# Patient Record
(demographics unavailable — no encounter records)

---

## 2024-10-29 NOTE — PHYSICAL EXAM
[Inflamed Nasal Mucosa] : inflamed nasal mucosa [NL] : regular rate and rhythm, normal S1, S2 audible, no murmurs [de-identified] : postnasal drip

## 2024-10-29 NOTE — REVIEW OF SYSTEMS
[Fever] : no fever [Headache] : no headache [Nasal Discharge] : no nasal discharge [Nasal Congestion] : nasal congestion [Sore Throat] : sore throat [Negative] : Genitourinary

## 2024-10-29 NOTE — DISCUSSION/SUMMARY
[FreeTextEntry1] : 13 year female with viral pharyngitis/postnasal drip. Rapid strep negative. Recommend tylenol/motrin for pain or fever, gargle with salt water, and throat lozenges as needed. Encourage fluids and rest. Return to office if symptoms worsen or persist.   Trial sudafed and flonase to help prevent it from turning into a sinus infection as well.

## 2024-10-29 NOTE — HISTORY OF PRESENT ILLNESS
[FreeTextEntry6] : 12 y/o patient presents today for sore throat and cough, x2-3 days. Stuffy nose/congestion as well. No nausea/vomiting/diarrhea. Sinus infections currently in family. No fevers. Afebrile in office. She is not having much nasal discharge. Her sore throat improved throughout the day today. She is prone to sinus infections. She took motrin today.

## 2024-11-05 NOTE — DISCUSSION/SUMMARY
[FreeTextEntry1] : 13 year female with acute sinusitis. Recommend antibiotics as prescribed, nasal saline rinses as tolerated. Return if symptoms worsen or persist. May trial probiotic while on antibiotics.

## 2024-11-05 NOTE — HISTORY OF PRESENT ILLNESS
[FreeTextEntry6] : 12 y/o presents with stuffy nose and coughing at times for about 3 weeks. She was in the office initially and it was felt to be a URI at that point. She has not gotten better and is prone to sinusitis.

## 2024-11-05 NOTE — PHYSICAL EXAM
[Inflamed Nasal Mucosa] : inflamed nasal mucosa [Hypertrophied Nasal Mucosa] : hypertrophied nasal mucosa [NL] : regular rate and rhythm, normal S1, S2 audible, no murmurs

## 2025-03-21 NOTE — DISCUSSION/SUMMARY
[FreeTextEntry1] : Pityriasis rosea-- agree with diagnosis from urgent care. Advised can take several weeks to resolve. Not worsening. It seems worse at times and lighter at times. Can trial hydrocortisone cream if itchy or oral zyrtec. Rapid strep and flu negative. Right shoulder strain-- warm compresses, motrin, gentle stretching. Refer to orthopedics.  Total time dedicated to this patient's visit includes preparing to see patient (reviewing chart, any pertinent labs/consults, etc.), obtaining and/or reviewing separately obtained history from patient and parent, performing medical examination, evaluation, counseling and educating patient/parent, ordering any needed medications and/or labs, documenting clinical information in the electronic record, reviewing any results subsequent to the orders placed during visit and discussing with patient/parent. Total time spent: 30 minutes.

## 2025-03-21 NOTE — PHYSICAL EXAM
[Clear Rhinorrhea] : clear rhinorrhea [NL] : warm, clear [de-identified] : right posterior shoulder pain, tenderness with muscle palpation between neck and right shoulder, full ROM and strength [de-identified] : erythematous patchy rash to abdomen, between breasts, and back

## 2025-03-21 NOTE — REVIEW OF SYSTEMS
[Fever] : no fever [Headache] : headache [Nasal Congestion] : nasal congestion [Rash] : rash [Itching] : itching [Negative] : Genitourinary [FreeTextEntry1] : shoulder pain

## 2025-03-21 NOTE — HISTORY OF PRESENT ILLNESS
[FreeTextEntry6] : 14 y/o patient presents today for rash on the stomach/back x 1-2 weeks, sore right shoulder x 2 weeks and head aches with nasal congestion x2 days. No fevers, Afebrile in office. The rash is itchy. They went to a walk in urgent care last weekend and was diagnosed with "Delmar tree rash". Rash is not improving so dad wanted second opinion. Shoulder has been hurting, slightly improving but not significantly. She plays soccer but has not played the last 2 weeks or so since her shoulder started to hurt. Dad not sure if the shoulder pain is related to the rash or an injury.

## 2025-03-24 NOTE — REASON FOR VISIT
[Initial Evaluation] : an initial evaluation [FreeTextEntry1] : right knee injury [Patient] : patient [Mother] : mother

## 2025-03-24 NOTE — DATA REVIEWED
[de-identified] : 3 views of the right knee 3/23/25 in the system of the right knee are negative for fracture or dislocation. Good overall alignment.

## 2025-03-24 NOTE — BIRTH HISTORY
[Duration: ___ wks] : duration: [unfilled] weeks [] :  [Normal?] : delivery not normal [___ lbs.] : [unfilled] lbs [Was child in NICU?] : Child was not in NICU [FreeTextEntry6] : twin

## 2025-03-24 NOTE — REVIEW OF SYSTEMS
[Change in Activity] : change in activity [Eczema] : eczema [Heart Problems] : no heart problems [Congestion] : no congestion [Feeding Problem] : no feeding problem [Menarche] : ~T menarche [Limping] : limping [Joint Pains] : arthralgias [Joint Swelling] : no joint swelling [Appropriate Age Development] : development appropriate for age [Sleep Disturbances] : ~T no sleep disturbances

## 2025-03-24 NOTE — ASSESSMENT
[FreeTextEntry1] : right knee effusion right knee injury  limited ROM of the right knee  The history for today's visit was obtained from the child, as well as the parent. The child's history was unreliable alone due to age and therefore, the parent was used today as an independent historian. 3 views of the right knee 3/23/25 in the system of the right knee are negative for fracture or dislocation. Good overall alignment.  She is guarding for the exam and cannot fully extend her knee. MRI of the right knee is needed to r/o meniscal pathology and evaluate the ACL to ensure no ACL tear.  Our office will obtain authorization and contact parent once obtained.  She will continue the ace and the crutches WBAT.  No gym or sports.  She will f/u after the MRI to discuss the results and plan of action. If meniscal tear present, surgery may be indicated  All questions answered. Parent in agreement with the plan. Shawna STONER, LOU, PAC, have acted as a scribe and documented the above for Dr. Ortega

## 2025-03-24 NOTE — HISTORY OF PRESENT ILLNESS
[FreeTextEntry1] : 12 yo female presents with mother for evaluation of right knee injury. She states she was playing soccer yesterday and she injured the knee. At the time, she heard a ":pop" and developed pain. She was unable to weight bear and she is unable to fully straighten the knee. She was seen at [facility] yesterday and xrays taken at that time were negative for fracture or dislocation. She has been icing and taking NSAIDS but pain still present. No prior knee injury reported. She also has difficulty bending the knee. Pain localized to the sides of the knee and back of the knee.  [Stable] : stable

## 2025-03-24 NOTE — PHYSICAL EXAM
[FreeTextEntry1] : GAIT: ambulates with crutches, NWB on affected extremity with good coordination and balance. Shows competency with crutching. GENERAL: alert, cooperative pleasant young 14 yo female in NAD SKIN: The skin is intact, warm, pink and dry over the area examined. EYES: Normal conjunctiva, normal eyelids and pupils were equal and round. ENT: normal ears, normal nose and normal lips. CARDIOVASCULAR: brisk capillary refill, but no peripheral edema. RESPIRATORY: The patient is in no apparent respiratory distress. They're taking full deep breaths without use of accessory muscles or evidence of audible wheezes or stridor without the use of a stethoscope. Normal respiratory effort. ABDOMEN: not examined   Hips: full symmetrical ROM without tenderness elicited.  right Knee: + effusion noted. No STS, erythema or warmth noted. unable to fully extend the knee, lacking approx 10 degrees, flexion to approx 60 degrees.  tender over the medial and lateral joint line as well as the posterior aspect lateral joint line.  No instability to varus/valgus stress.  Guarding for exam but Lachman and Anterior/posterior drawer appear to have an endpoint. + joint line tenderness. Negative patella apprehension. Negative patella grind test. Negative patella J-sign. No calf tenderness ankle: full ROM without instability to stress. No tenderness or STS.  Distal motor 5/5 sensation grossly intact brisk cap refill

## 2025-04-08 NOTE — DATA REVIEWED
[de-identified] : Right knee MRI was obtained at [facility] on 3/27/2025 and independently reviewed today: There is evidence for recent anterior cruciate ligament rupture from its proximal femoral attachment. There are associated contusions including a mildly impacted contusion of the lateral femoral central sulcus as well as the posterior aspects of the medial and lateral tibial plateaus.  Prior imaging was once again reviewed and is as noted below: 3 views of the right knee 3/23/25 in the system of the right knee are negative for fracture or dislocation. Good overall alignment.

## 2025-04-08 NOTE — END OF VISIT
[FreeTextEntry3] : IFrantz MD, personally saw and evaluated the patient and developed the plan as documented above. I concur or have edited the note as appropriate. [Time Spent: ___ minutes] : I have spent [unfilled] minutes of time on the encounter which excludes teaching and separately reported services.

## 2025-04-08 NOTE — ASSESSMENT
[FreeTextEntry1] : 13-year-old female with a right knee injury sustained on 3/23/2025, 1.5 weeks ago, when she was in soccer and felt a pop.  MRI showing an ACL rupture.  -We discussed the interval progress, physical exam, and all available images at length during today's visit with patient and her parent/guardian who served as an independent historian due to child's age and unreliable nature of history. -Right knee MRI was obtained at [facility] on 3/27/2025 and independently reviewed today: There is evidence for recent anterior cruciate ligament rupture from its proximal femoral attachment. There are associated contusions including a mildly impacted contusion of the lateral femoral central sulcus as well as the posterior aspects of the medial and lateral tibial plateaus. -The etiology, pathoanatomy, treatment modalities, and expected natural history of the injury were discussed at length today. -Clinically, she has expected limited range of motion of the knee with a large knee joint effusion. -Significance of ACL ruptures in adolescents/young adults was discussed today.  Treatment options consisting of operative versus nonoperative approaches were addressed.  Given significantly increased risk of chronic instability, early arthritis, and subsequent meniscal tears in ACL deficient knees, we recommended operative intervention to include ACL reconstruction. -The patient and family understood the implications and recommendations and elected to further pursue surgical intervention -Therefore, we have referred the patient to my partner, Dr. Augustine for surgical evaluation.  Contact information was provided.  Additionally, our office will contact the family to arrange an appointment. -In the interim, she may continue to bear weight as tolerated on the right lower extremity.  We recommended daily knee range of motion exercises to improve her current arc of motion.  Sample exercises were demonstrated during today's visit. -She will remain out of all gym, sports, running, jumping, rough play.  School note was provided today. -OTC NSAIDs as needed -She may return to our clinic at anytime with any additional questions or concerns   All questions and concerns were addressed today. Parent and patient verbalize understanding and agree with plan of care.   I, Zandra Ibarra, have acted as a scribe and documented the above information for Dr. Ortega.   This note was created using Dragon Voice Recognition Software and may have been partially created using H[employer] software which was then reviewed and edited to the best of my ability. Sporadic inaccurate translation may have occurred. If there are any questions about content of the note, please contact the office for clarification.

## 2025-04-08 NOTE — HISTORY OF PRESENT ILLNESS
[FreeTextEntry1] : Rosa is a 13-year-old female with a right knee injury sustained on 3/23/2025.  Per reports she was playing soccer when she felt a pop in her knee.  She was unable to bear weight or straighten the knee.  She presented to [facility] where radiographs were obtained and were negative for fracture or dislocation.  It was recommended she follow-up with pediatric orthopedics.  On initial evaluation due to clinical examination and MRI of the knee was ordered. Please see prior clinic notes for additional information.   Today, she continues to report discomfort with knee range of motion.  She has been using her Ace wrap and crutches as directed.  She denies any numbness or tingling in the legs.  She has been icing.  She presents today for continued management of the above.

## 2025-04-08 NOTE — DATA REVIEWED
[de-identified] : Right knee MRI was obtained at [facility] on 3/27/2025 and independently reviewed today: There is evidence for recent anterior cruciate ligament rupture from its proximal femoral attachment. There are associated contusions including a mildly impacted contusion of the lateral femoral central sulcus as well as the posterior aspects of the medial and lateral tibial plateaus.  Prior imaging was once again reviewed and is as noted below: 3 views of the right knee 3/23/25 in the system of the right knee are negative for fracture or dislocation. Good overall alignment.

## 2025-04-08 NOTE — PHYSICAL EXAM
[FreeTextEntry1] : GAIT: ambulates with crutches, NWB on affected extremity with good coordination and balance. Shows competency with crutching.  GENERAL: alert, cooperative pleasant young 14 yo female in NAD SKIN: The skin is intact, warm, pink and dry over the area examined. EYES: Normal conjunctiva, normal eyelids and pupils were equal and round. ENT: normal ears, normal nose and normal lips. CARDIOVASCULAR: brisk capillary refill, but no peripheral edema. RESPIRATORY: The patient is in no apparent respiratory distress. They're taking full deep breaths without use of accessory muscles or evidence of audible wheezes or stridor without the use of a stethoscope. Normal respiratory effort. ABDOMEN: not examined    *unchanged** Hips: full symmetrical ROM without tenderness elicited.  right Knee: + effusion noted. No STS, erythema or warmth noted. unable to fully extend the knee, lacking approx 10 degrees, flexion to approx 60 degrees.  tender over the medial and lateral joint line as well as the posterior aspect lateral joint line.  No instability to varus/valgus stress.  Guarding for exam but Lachman and Anterior/posterior drawer appear to have an endpoint. + joint line tenderness. Negative patella apprehension. Negative patella grind test. Negative patella J-sign. No calf tenderness ankle: full ROM without instability to stress. No tenderness or STS.  Distal motor 5/5 sensation grossly intact brisk cap refill

## 2025-04-08 NOTE — ASSESSMENT
[FreeTextEntry1] : 13-year-old female with a right knee injury sustained on 3/23/2025, 1.5 weeks ago, when she was in soccer and felt a pop.  MRI showing an ACL rupture.  -We discussed the interval progress, physical exam, and all available images at length during today's visit with patient and her parent/guardian who served as an independent historian due to child's age and unreliable nature of history. -Right knee MRI was obtained at [facility] on 3/27/2025 and independently reviewed today: There is evidence for recent anterior cruciate ligament rupture from its proximal femoral attachment. There are associated contusions including a mildly impacted contusion of the lateral femoral central sulcus as well as the posterior aspects of the medial and lateral tibial plateaus. -The etiology, pathoanatomy, treatment modalities, and expected natural history of the injury were discussed at length today. -Clinically, she has expected limited range of motion of the knee with a large knee joint effusion. -Significance of ACL ruptures in adolescents/young adults was discussed today.  Treatment options consisting of operative versus nonoperative approaches were addressed.  Given significantly increased risk of chronic instability, early arthritis, and subsequent meniscal tears in ACL deficient knees, we recommended operative intervention to include ACL reconstruction. -The patient and family understood the implications and recommendations and elected to further pursue surgical intervention -Therefore, we have referred the patient to my partner, Dr. Augustine for surgical evaluation.  Contact information was provided.  Additionally, our office will contact the family to arrange an appointment. -In the interim, she may continue to bear weight as tolerated on the right lower extremity.  We recommended daily knee range of motion exercises to improve her current arc of motion.  Sample exercises were demonstrated during today's visit. -She will remain out of all gym, sports, running, jumping, rough play.  School note was provided today. -OTC NSAIDs as needed -She may return to our clinic at anytime with any additional questions or concerns   All questions and concerns were addressed today. Parent and patient verbalize understanding and agree with plan of care.   I, Zandra Ibarra, have acted as a scribe and documented the above information for Dr. Ortega.   This note was created using Dragon Voice Recognition Software and may have been partially created using Job[employer] software which was then reviewed and edited to the best of my ability. Sporadic inaccurate translation may have occurred. If there are any questions about content of the note, please contact the office for clarification.

## 2025-04-08 NOTE — REVIEW OF SYSTEMS
[Change in Activity] : change in activity [Eczema] : eczema [Menarche] : ~T menarche [Limping] : limping [Joint Pains] : arthralgias [Appropriate Age Development] : development appropriate for age [Fever Above 102] : no fever [Malaise] : no malaise [Rash] : no rash [Eye Pain] : no eye pain [Heart Problems] : no heart problems [Congestion] : no congestion [Feeding Problem] : no feeding problem [Joint Swelling] : no joint swelling [Sleep Disturbances] : ~T no sleep disturbances

## 2025-04-08 NOTE — BIRTH HISTORY
[Duration: ___ wks] : duration: [unfilled] weeks [] :  [___ lbs.] : [unfilled] lbs [Normal?] : delivery not normal [Was child in NICU?] : Child was not in NICU [FreeTextEntry6] : twin

## 2025-04-08 NOTE — HISTORY OF PRESENT ILLNESS
[FreeTextEntry1] : Rosa is a 13-year-old female with a right knee injury sustained on 3/23/2025.  Per reports she was playing soccer when she felt a pop in her knee.  She was unable to bear weight or straighten the knee.  She presented to [facility] where radiographs were obtained and were negative for fracture or dislocation.  It was recommended she follow-up with pediatric orthopedics.  On initial evaluation due to clinical examination and MRI of the knee was ordered. Please see prior clinic notes for additional information.   Today, she continues to report discomfort with knee range of motion.  She has been using her Ace wrap and crutches as directed.  She denies any numbness or tingling in the legs.  She has been icing.  She presents today for continued management of the above.

## 2025-04-08 NOTE — REASON FOR VISIT
[Follow Up] : a follow up visit [Patient] : patient [Father] : father [FreeTextEntry1] : right knee injury sustained on 3/23/2025

## 2025-04-15 NOTE — ASSESSMENT
[FreeTextEntry1] : This young lady comes today accompanied by her mother regarding the chief complaint of right knee anterior cruciate ligament tear.  The patient is referred by my partner, Dr. Ortega regarding this injury, which was confirmed with MRI scan, to discuss surgical management.  Today's visit lasted for approximately 30 minutes with time spent discussing surgery including postoperative care, rehabilitation, and return to full sport.  This is time independent of education, teaching as well as other reported services.   INTERVAL HISTORY:  Rosa comes today accompanied by her mother.  The patient had sustained an injury to her knee causing a large knee joint effusion with a palpable pop.  The patient is an active athlete and is anxious to return to full physical activities.  She was evaluated by my partner, Dr. Ortega who had appreciated some instability on her exam.  Large knee joint effusion prompted an MRI scan, which was ultimately completed confirming the presence of a full-thickness anterior cruciate ligament tear.  There did not appear to be any associated meniscal pathology.  The patient, of note, had precocious puberty and has closed all of her growth plates which are even evident on the MRI scan. The family comes today to discuss surgical management.   Since the day of the last evaluation, there has been no significant change in past medical or social history.   Review of systems today is negative for fevers, chills, chest pain, shortness of breath, or rashes.   PHYSICAL EXAMINATION: On physical examination today, Rosa is in no apparent distress.  She is pleasant, cooperative and alert.  The patient ambulates with the assistance of crutches.  She still has swelling about the knee and has difficulty coming to full extension but can flex to approximately 90 degrees easily.  The patient does have a moderate suprapatellar effusion.  She is guarding on exam, making it difficult to appreciate for any endpoint with anterior drawer and Lachman examination, and is guarding too much on exam to perform a pivot shift, with negative medial and lateral joint line tenderness.  Early quadriceps atrophy is noted.   MRI imaging is available for review indicating the evidence of a full-thickness anterior cruciate ligament tear.  The patient's distal femoral physis and proximal tibial physis are gone based on skeletal maturity.  No gross evidence of medial or lateral meniscal damage.  The patient has a moderate-to-large suprapatellar effusion.  No other injury, including collateral ligament injury, is noted.  The patient does have a favorable patellar tendon length which is just under 40 mm.   ASSESSMENT/PLAN: Rosa is a 13-year-old female who sustained a right knee full-thickness anterior cruciate ligament tear. It does not appear as though there is any other evidence of collateral ligament damage or meniscal damage.  Today, I discussed the operative procedure.  Based on her active lifestyle as a , I have made recommendations for patellar tendon autograft as she may receive an adult technique based on the closure of her distal femoral and proximal tibial physes.  I reviewed the procedure as well as possible complications of chronic anterior knee pain and difficulty kneeling, although this has been less significant in my practice as I have had very few patients complain of this.  I did discuss other graft options, including quadriceps autograft.  Based on recent literature, there is a potential risk for increased risk of cyclops lesions which would necessitate a secondary surgical procedure. The family has elected for patellar tendon autograft.  I reviewed the postoperative protocol which would involve approximately four weeks of bracing, up to six weeks if indeed a meniscal repair is performed, although the MRI does not suggest any meniscal pathology.  Weightbearing restrictions will be highly dependent on the meniscal issues that are encountered intraoperatively, especially if a meniscal repair is performed.  I reviewed activity restrictions were necessary up to nine months postop with passing of a return-to-play test in order to evaluate if Rosa is ready to resume competitive sport.  I did discuss the fact that accelerated protocols are associated with a higher rate of re-tear of the ACL to an unacceptable level at base minimum.  Even with adequate rehabilitation, the pediatric rate of re-tear is twice that of an adult.  The patient will continue with prehabilitation to work on knee range of motion and we will begin surgical planning.  All questions were answered to satisfaction.  Both Rosa and her mother who act as an independent historian today given the child's pediatric age, expressed understanding and agree.

## 2025-04-16 NOTE — DISCUSSION/SUMMARY
[FreeTextEntry1] : 1) Sinusitis- Recommend antibiotics, nasal saline, and mucinex. Return if symptoms worsen or persist. CEFDINIR sent; discussed meds and side effects; use probiotics. If any rash or concerns, return to sent 2) Viral syndrome suspected. Patient looks very well today. Continue fever control. No signs of meningitis or dehydration today. Monitor for any worsening symptoms and return to be seen if fever lasts more than 5 days or accompanied by concerning symptoms/signs as discussed. RVP sent out for flu, covid and RSV 3) Cough and congestion- will also check Mycoplasma PCR

## 2025-04-16 NOTE — PHYSICAL EXAM
[Mucoid Discharge] : mucoid discharge [NL] : warm, clear [Clear] : left tympanic membrane not clear [Erythema] : erythema [Bulging] : bulging [FreeTextEntry4] : sinus congestion

## 2025-04-16 NOTE — HISTORY OF PRESENT ILLNESS
[EENT/Resp Symptoms] : EENT/RESPIRATORY SYMPTOMS [Runny nose] : runny nose [Nasal congestion] : nasal congestion [Cough] : cough [___ Day(s)] : [unfilled] day(s) [Intermittent] : intermittent [Active] : active [Sick Contacts: ___] : sick contacts: [unfilled] [Dry cough] : dry cough [At Night] : at night [When Supine] : when supine [Humidifier] : humidifier [Nasal saline] : nasal saline [Ibuprofen] : ibuprofen [Known Exposure to COVID-19] : no known exposure to COVID-19 [History of recent COVID-19 infection] : no history of recent COVID-19 infection [Fever] : no fever [Eye Redness] : no eye redness [Eye Discharge] : no eye discharge [Ear Pain] : no ear pain [Palpitations] : no palpitations [Chest Pain] : no chest pain [SOB] : no shortness of breath [Decreased Appetite] : no decreased appetite [Vomiting] : no vomiting [Diarrhea] : no diarrhea [Decreased Urine Output] : no decreased urine output [Rash] : no rash [Myalgia] : no myalgia [de-identified] : post nasal drip

## 2025-04-28 NOTE — PHYSICAL EXAM

## 2025-04-28 NOTE — DISCUSSION/SUMMARY
[Normal Growth] : growth [Normal Development] : development  [No Elimination Concerns] : elimination [Continue Regimen] : feeding [No Skin Concerns] : skin [Normal Sleep Pattern] : sleep [None] : no medical problems [Anticipatory Guidance Given] : Anticipatory guidance addressed as per the history of present illness section [Physical Growth and Development] : physical growth and development [Social and Academic Competence] : social and academic competence [Emotional Well-Being] : emotional well-being [Risk Reduction] : risk reduction [Violence and Injury Prevention] : violence and injury prevention [No Vaccines] : no vaccines needed [No Medications] : ~He/She~ is not on any medications [Patient] : patient [Parent/Guardian] : Parent/Guardian [FreeTextEntry1] : THis is a adolescent patient here for routine exam .I  have recommended that the patient participates in 60 minutes or more of physical activity a day.   I explained that it is important to limit screen time to less than 2 hrs a day. Patients diet was discussed and advice given on how to maintain good healthy caloric intake . Physical exam is within normal limits . Immunizations were discussed . HPV #2 given Patient to follow up in 1 year for routine exam  Craft Screen- PASSED

## 2025-04-28 NOTE — HISTORY OF PRESENT ILLNESS
[Mother] : mother [Yes] : Patient goes to dentist yearly [Up to date] : Up to date [Normal] : normal [Eats meals with family] : eats meals with family [Has family members/adults to turn to for help] : has family members/adults to turn to for help [Is permitted and is able to make independent decisions] : Is permitted and is able to make independent decisions [Grade: ____] : Grade: [unfilled] [Normal Performance] : normal performance [Normal Behavior/Attention] : normal behavior/attention [Normal Homework] : normal homework [Screen time (except homework) less than 2 hours a day] : screen time (except homework) less than 2 hours a day [No] : No cigarette smoke exposure [Uses safety belts/safety equipment] : uses safety belts/safety equipment  [Has ways to cope with stress] : has ways to cope with stress [Displays self-confidence] : displays self-confidence [NO] : No [Days of Bleeding: _____] : Days of bleeding: [unfilled] [Cycle Length: _____ days] : Cycle Length: [unfilled] days [Eats regular meals including adequate fruits and vegetables] : eats regular meals including adequate fruits and vegetables [Has friends] : has friends [At least 1 hour of physical activity a day] : at least 1 hour of physical activity a day [Sleep Concerns] : no sleep concerns [Has concerns about body or appearance] : does not have concerns about body or appearance [Has interests/participates in community activities/volunteers] : does not have interests/participates in community activities/volunteers [Uses electronic nicotine delivery system] : does not use electronic nicotine delivery system [Uses tobacco] : does not use tobacco [Uses drugs] : does not use drugs  [Drinks alcohol] : does not drink alcohol [Has problems with sleep] : does not have problems with sleep [Gets depressed, anxious, or irritable/has mood swings] : does not get depressed, anxious, or irritable/has mood swings [Has thought about hurting self or considered suicide] : has not thought about hurting self or considered suicide [de-identified] : good student going to [employer] in the Fall [de-identified] : plays soccer ,  now  held due to tear of ACL in the rt knee [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 parents deny any recent illnesses or ER visits

## 2025-04-28 NOTE — RISK ASSESSMENT
[Little interest or pleasure doing things] : 1) Little interest or pleasure doing things [Feeling down, depressed, or hopeless] : 2) Feeling down, depressed, or hopeless [0] : 2) Feeling down, depressed, or hopeless: Not at all (0) [No Increased risk of SCA or SCD] : No Increased risk of SCA or SCD    [EBQ3Hkcip] : 0 [Have you ever fainted, passed out or had an unexplained seizure suddenly and without warning, especially during exercise or in response] : Have you ever fainted, passed out or had an unexplained seizure suddenly and without warning, especially during exercise or in response to sudden loud noises such as doorbells, alarm clocks and ringing telephones? No [Have you ever had exercise-related chest pain or shortness of breath?] : Have you ever had exercise-related chest pain or shortness of breath? No [Has anyone in your immediate family (parents, grandparents, siblings) or other more distant relatives (aunts, uncles, cousins)  of heart] : Has anyone in your immediate family (parents, grandparents, siblings) or other more distant relatives (aunts, uncles, cousins)  of heart problems or had an unexpected sudden death before age 50 (This would include unexpected drownings, unexplained car accidents in which the relative was driving or sudden infant death syndrome.)? No [Are you related to anyone with hypertrophic cardiomyopathy or hypertrophic obstructive cardiomyopathy, Marfan syndrome, arrhythmogenic] : Are you related to anyone with hypertrophic cardiomyopathy or hypertrophic obstructive cardiomyopathy, Marfan syndrome, arrhythmogenic right ventricular cardiomyopathy, long QT syndrome, short QT syndrome, Brugada syndrome or catecholaminergic polymorphic ventricular tachycardia, or anyone younger than 50 years with a pacemaker or implantable defibrillator? No

## 2025-05-02 NOTE — HISTORY OF PRESENT ILLNESS
[Good] : Good [Preoperative Visit] : for a medical evaluation prior to surgery [Fever] : no fever [Chills] : no chills [Runny Nose] : no runny nose [Earache] : no earache [Headache] : no headache [Sore Throat] : no sore throat [Cough] : no cough [Appetite] : no decrease in appetite [Nausea] : no nausea [Vomiting] : no vomiting [Abdominal Pain] : no abdominal pain [Diarrhea] : no diarrhea [Easy Bruising] : no easy bruising [Rash] : no rash [Dysuria] : no dysuria [Urinary Frequency] : no urinary frequency [Prior Anesthesia] : Prior anesthesia [Prev Anesthesia Reaction] : no previous anesthesia reaction [Diabetes] : no diabetes [Pulmonary Disease] : no pulmonary disease [Renal Disease] : no renal disease [Frequent use of NSAIDs] : no use of NSAIDs [Anesthesia Reaction] : no anesthesia reaction [Clotting Disorder] : no clotting disorder [Bleeding Disorder] : no bleeding disorder [Sudden Death] : no sudden death [FreeTextEntry1] : ACL repair - RIGHT  [FreeTextEntry2] : 5/14/25 [de-identified] : Dr Augustine

## 2025-05-02 NOTE — PHYSICAL EXAM
[General Appearance - Well Developed] : interactive [General Appearance - Well-Appearing] : well appearing [General Appearance - In No Acute Distress] : in no acute distress [Outer Ear] : the ears and nose were normal in appearance [Examination Of The Oral Cavity] : mucous membranes were moist and pink [Normal Appearance] : was normal in appearance [Neck Supple] : was supple [Respiration, Rhythm And Depth] : normal respiratory rhythm and effort [Auscultation Breath Sounds / Voice Sounds] : clear bilateral breath sounds [Heart Rate And Rhythm] : heart rate and rhythm were normal [Heart Sounds] : normal S1 and S2 [Murmurs] : no murmurs [Bowel Sounds] : normal bowel sounds [Abdomen Soft] : soft [Abdomen Tenderness] : non-tender [Abdominal Distention] : nondistended [] : no hepato-splenomegaly [No Visual Abnormalities] : no visible abnormailities [Normal] : normal texture and mobility [Enlarged Diffusely] : was not enlarged [FreeTextEntry1] : RIGHT  KNEE swelling- slightly   [Abnormal Color] : normal color and pigmentation [Skin Lesions 1] : no skin lesions were observed [Skin Turgor Decreased] : normal skin turgor

## 2025-05-27 NOTE — ASSESSMENT
[FreeTextEntry1] : This young lady comes today accompanied by her mother regarding her operatively treated right knee which underwent anterior cruciate ligament reconstruction with patellar tendon autograft on 05/14/2025.   INTERVAL HISTORY:  Rosa has been doing well. Her pain has been well managed.  She has been using crutches to ambulate.  She reports no issues with her surgical incision site and at this point, she is off of any type of narcotic medications.  The family comes today for regularly scheduled followup to see if she may initiate physical therapy services.   Since the day of the last evaluation, there has been no significant change in past medical or social history.   PHYSICAL EXAMINATION: On physical examination today, Rosa is in no apparent distress.  Her surgical wound is inspected.  It appears to be dry with dried blood.  There are no concerns today.  She has comfortable knee range of motion from 0 to 30 degrees with visible atrophy of the quadriceps and inability to maintain a straight leg raise against gravity.  Sensation is grossly intact to light touch throughout with intact tibialis anterior function.  There is evidence of ecchymosis over the surgical incision as well as posterior and laterally, which all appears to be a normal postoperative appearance to the limb.   X-ray imaging was performed today, AP, lateral and oblique views of the right knee, indicating good position of the hardware as well as the bone tunnels with fixation of the bone blocks.   ASSESSMENT/PLAN: Rosa is a 14-year-old female who has the underlying diagnosis of right knee anterior cruciate ligament tear, which underwent ACL reconstruction on 05/14/2025.  The surgical wound appears to be healing well.  We will obtain a Hunt brace, which will be essential in rehabilitation, with freedom from 0 to 30 degrees in order to control knee range of motion and protect the knee during rehabilitation.  In addition, a dedicated protocol prescription for physical therapy for ACL reconstruction with patellar tendon autograft was provided today.  The patient may advance to weightbearing as tolerated.  I would like to see Rosa back in clinical reassessment in approximately four weeks.  All questions were answered to satisfaction today.  Rosa and her mother expressed understanding and agree.

## 2025-05-27 NOTE — ASSESSMENT
[FreeTextEntry1] : This young lady comes today accompanied by her mother regarding her operatively treated right knee which underwent anterior cruciate ligament reconstruction with patellar tendon autograft on 05/14/2025.   INTERVAL HISTORY:  Rosa has been doing well. Her pain has been well managed.  She has been using crutches to ambulate.  She reports no issues with her surgical incision site and at this point, she is off of any type of narcotic medications.  The family comes today for regularly scheduled followup to see if she may initiate physical therapy services.   Since the day of the last evaluation, there has been no significant change in past medical or social history.   PHYSICAL EXAMINATION: On physical examination today, Rosa is in no apparent distress.  Her surgical wound is inspected.  It appears to be dry with dried blood.  There are no concerns today.  She has comfortable knee range of motion from 0 to 30 degrees with visible atrophy of the quadriceps and inability to maintain a straight leg raise against gravity.  Sensation is grossly intact to light touch throughout with intact tibialis anterior function.  There is evidence of ecchymosis over the surgical incision as well as posterior and laterally, which all appears to be a normal postoperative appearance to the limb.   X-ray imaging was performed today, AP, lateral and oblique views of the right knee, indicating good position of the hardware as well as the bone tunnels with fixation of the bone blocks.   ASSESSMENT/PLAN: Rosa is a 14-year-old female who has the underlying diagnosis of right knee anterior cruciate ligament tear, which underwent ACL reconstruction on 05/14/2025.  The surgical wound appears to be healing well.  We will obtain a Towner brace, which will be essential in rehabilitation, with freedom from 0 to 30 degrees in order to control knee range of motion and protect the knee during rehabilitation.  In addition, a dedicated protocol prescription for physical therapy for ACL reconstruction with patellar tendon autograft was provided today.  The patient may advance to weightbearing as tolerated.  I would like to see Rosa back in clinical reassessment in approximately four weeks.  All questions were answered to satisfaction today.  Rosa and her mother expressed understanding and agree.

## 2025-06-20 NOTE — POST OP
[Neuro Intact] : an unremarkable neurological exam [Vascular Intact] : ~T peripheral vascular exam normal [Doing Well] : is doing well [No Sports] : not to participate in sports [de-identified] : 5/14/25: right knee arthroscopically assisted ACL recon with patellar tendon autograft.  [de-identified] : 13 yo female s/p above. She is doing well. She is using dilma brace. She is undergoing PT and making improvements. No pain reported.  [de-identified] : incision well healed. FUll extension. ,5 degrees recurvatum. FLexion approx 110 degrees with soft endpoint. Small effusion noted Lachman stable to stress/ANterior drawer stable No joint line tenderness She is able to SLR but with approx 20 degree lag.   [de-identified] : She will continue the brace as she still has a lag present. She  will continue PT services.  She can go to [employer] [place]. NO dancing recommended. Care to be taken on the sand when walking. No swimming in the ocean. Pool swimming is allowed SHe will f/u in 6 weeks for repeat check. All questions answered. Parent in agreement with the plan.  IShawna, MPAS, PAC, have acted as a scribe and documented the above for Dr. Augustine.  The above documentation completed by the scribe is an accurate record of both my words and actions.  ANTHONYD

## 2025-06-27 NOTE — HISTORY OF PRESENT ILLNESS
[de-identified] : ear infection [FreeTextEntry6] : 13 y/o is being seen for an ear infection follow up. Patient finished medications and is doing well. Afebrile in office.  dx LEFT OM  treated with AMoxil-  no issues RIGHT knee in brace S/p ACL repair